# Patient Record
Sex: FEMALE | Race: WHITE | NOT HISPANIC OR LATINO | Employment: FULL TIME | URBAN - METROPOLITAN AREA
[De-identification: names, ages, dates, MRNs, and addresses within clinical notes are randomized per-mention and may not be internally consistent; named-entity substitution may affect disease eponyms.]

---

## 2018-03-17 ENCOUNTER — OFFICE VISIT (OUTPATIENT)
Dept: URGENT CARE | Facility: MEDICAL CENTER | Age: 33
End: 2018-03-17
Payer: COMMERCIAL

## 2018-03-17 VITALS
DIASTOLIC BLOOD PRESSURE: 66 MMHG | OXYGEN SATURATION: 99 % | RESPIRATION RATE: 16 BRPM | SYSTOLIC BLOOD PRESSURE: 112 MMHG | TEMPERATURE: 97.2 F | HEART RATE: 88 BPM

## 2018-03-17 DIAGNOSIS — S05.02XA LEFT CORNEAL ABRASION, INITIAL ENCOUNTER: Primary | ICD-10-CM

## 2018-03-17 PROCEDURE — 99203 OFFICE O/P NEW LOW 30 MIN: CPT | Performed by: FAMILY MEDICINE

## 2018-03-17 RX ORDER — ERYTHROMYCIN 5 MG/G
0.5 OINTMENT OPHTHALMIC EVERY 8 HOURS SCHEDULED
Qty: 3.5 G | Refills: 0 | Status: SHIPPED | OUTPATIENT
Start: 2018-03-17

## 2018-03-17 NOTE — PROGRESS NOTES
[facility]"[employer] Now        NAME: Seymour Rey is a 34 y o  female  : 1985    MRN: [id number]  DATE: 2018  TIME: 2:41 PM    Assessment and Plan   Left corneal abrasion, initial encounter [S05  02XA]  1  Left corneal abrasion, initial encounter  erythromycin (ILOTYCIN) ophthalmic ointment         Patient Instructions       Follow up with PCP in 3-5 days  Proceed to  ER if symptoms worsen  Chief Complaint     Chief Complaint   Patient presents with    Eye Pain     PT states was outside, felt as though something in left eye since this am  Has redness, increased tearing and pain  History of Present Illness       Patient here with acute left thigh pain since 10:00 a m  [place] Patient walk-in on door when she believes something flew in her eye  However, she continued to rub dry after the incident  Describes foreign body sensation  Also describes increased tearing  Refers to blurry vision  Denies any light sensitivity  She managed to rinse left eye with I will rinse with no significant improvement  Eye Pain    Pertinent negatives include no photophobia  Review of Systems   Review of Systems   Constitutional: Negative  Eyes: Positive for pain and visual disturbance  Negative for photophobia  Current Medications       Current Outpatient Prescriptions:     erythromycin (ILOTYCIN) ophthalmic ointment, Administer 0 5 inches into the left eye every 8 (eight) hours, Disp: 3 5 g, Rfl: 0    Current Allergies     Allergies as of 2018    (No Known Allergies)            The following portions of the patient's history were reviewed and updated as appropriate: allergies, current medications, past family history, past medical history, past social history, past surgical history and problem list      No past medical history on file  No past surgical history on file  No family history on file  Medications have been verified          Objective   /66 (BP Location: Right arm, Patient Position: Sitting, Cuff Size: Standard)   Pulse 88   Temp (!) 97 2 °F (36 2 °C) (Tympanic)   Resp 16   SpO2 99%        Physical Exam     Physical Exam   Constitutional: She appears well-developed and well-nourished  Eyes: EOM are normal  Pupils are equal, round, and reactive to light  Vision test-within normal limit  Ophthalmological exam after applying proparacaine drops and Flourescein dye reveals corneal abrasion at approximately 6 o'clock  It measures approximately 2 mm in size  No foreign body observed  Eversion of the upper lower eyelid reveals no foreign body  Nursing note and vitals reviewed

## 2018-03-17 NOTE — PATIENT INSTRUCTIONS
Flourescein examination confirms left corneal abrasion after applying proparacaine drop into the left thigh  I placed the patient on erythromycin ointment to be applied in to left eye 3 times a day  Apply cool compress as needed  Recommend patient follow up with eye care specialist if pain or blurry vision worsens  Corneal Abrasion   WHAT YOU NEED TO KNOW:   A corneal abrasion is a scratch on the cornea of your eye  The cornea is the clear layer that covers the front of your eye  A small scratch may heal in 1 to 2 days  Deeper or larger scratches may take longer to heal         DISCHARGE INSTRUCTIONS:   Contact your healthcare provider if:   · Your eye pain or vision gets worse  · You have yellow or green drainage from your eye  · You have questions or concerns about your condition or care  Medicines:   · Medicines  may be given in the form of eyedrops or ointment to help prevent an eye infection  You may also be given eye drops to decrease pain  Ask how to take this medicine safely  · Take your medicine as directed  Contact your healthcare provider if you think your medicine is not helping or if you have side effects  Tell him or her if you are allergic to any medicine  Keep a list of the medicines, vitamins, and herbs you take  Include the amounts, and when and why you take them  Bring the list or the pill bottles to follow-up visits  Carry your medicine list with you in case of an emergency  Follow up with your healthcare provider as directed:  Write down your questions so you remember to ask them during your visits  Self-care:   · Do not touch or rub your eye  · Ask your healthcare provider when you can start your normal activities  · Ask your healthcare provider when you can wear your contact lenses  · Wear sunglasses in bright light until your eyes feel better  Help prevent corneal abrasions:   · Remove your contact lenses if your eyes feel dry or irritated       · Wash your hands if you need to touch your eyes or your face  · Trim your child's fingernails so he cannot scratch his eye  · Wear protective eyewear when you work with chemicals, wood, dust, or metal      · Wear protective eyewear when you play sports  · Do not wear your contacts for longer than you should  · Do not wear colored lenses or lenses with shapes on them  These lenses may cause eye damage and vision loss  · Do not wear glitter makeup  Glitter can easily get into your eyes and under contact lenses  · Do not sleep with your contacts in your eyes  © 2017 [employer] Information is for End User's use only and may not be sold, redistributed or otherwise used for commercial purposes  All illustrations and images included in CareNotes® are the copyrighted property of [employer]  or [employer]  The above information is an  only  It is not intended as medical advice for individual conditions or treatments  Talk to your doctor, nurse or pharmacist before following any medical regimen to see if it is safe and effective for you